# Patient Record
Sex: MALE | URBAN - METROPOLITAN AREA
[De-identification: names, ages, dates, MRNs, and addresses within clinical notes are randomized per-mention and may not be internally consistent; named-entity substitution may affect disease eponyms.]

---

## 2023-11-21 ENCOUNTER — APPOINTMENT (RX ONLY)
Dept: URBAN - METROPOLITAN AREA CLINIC 335 | Facility: CLINIC | Age: 23
Setting detail: DERMATOLOGY
End: 2023-11-21

## 2023-11-21 DIAGNOSIS — L70.0 ACNE VULGARIS: ICD-10-CM | Status: INADEQUATELY CONTROLLED

## 2023-11-21 PROCEDURE — ? COUNSELING

## 2023-11-21 PROCEDURE — ? NO CHARGE VISIT

## 2023-11-21 PROCEDURE — ? PRESCRIPTION

## 2023-11-21 RX ORDER — CLINDAMYCIN PHOSPHATE 10 MG/ML
SOLUTION TOPICAL
Qty: 60 | Refills: 2 | Status: ERX | COMMUNITY
Start: 2023-11-21

## 2023-11-21 RX ADMIN — CLINDAMYCIN PHOSPHATE: 10 SOLUTION TOPICAL at 00:00

## 2023-11-21 ASSESSMENT — LOCATION ZONE DERM: LOCATION ZONE: FACE

## 2023-11-21 ASSESSMENT — LOCATION SIMPLE DESCRIPTION DERM: LOCATION SIMPLE: LEFT CHEEK

## 2023-11-21 ASSESSMENT — LOCATION DETAILED DESCRIPTION DERM: LOCATION DETAILED: LEFT CENTRAL MALAR CHEEK

## 2023-11-21 NOTE — PROCEDURE: COUNSELING
Sarecycline Counseling: Patient advised regarding possible photosensitivity and discoloration of the teeth, skin, lips, tongue and gums.  Patient instructed to avoid sunlight, if possible.  When exposed to sunlight, patients should wear protective clothing, sunglasses, and sunscreen.  The patient was instructed to call the office immediately if the following severe adverse effects occur:  hearing changes, easy bruising/bleeding, severe headache, or vision changes.  The patient verbalized understanding of the proper use and possible adverse effects of sarecycline.  All of the patient's questions and concerns were addressed.
Benzoyl Peroxide Pregnancy And Lactation Text: This medication is Pregnancy Category C. It is unknown if benzoyl peroxide is excreted in breast milk.
Doxycycline Pregnancy And Lactation Text: This medication is Pregnancy Category D and not consider safe during pregnancy. It is also excreted in breast milk but is considered safe for shorter treatment courses.
Azithromycin Counseling:  I discussed with the patient the risks of azithromycin including but not limited to GI upset, allergic reaction, drug rash, diarrhea, and yeast infections.
Azelaic Acid Pregnancy And Lactation Text: This medication is considered safe during pregnancy and breast feeding.
Winlevi Counseling:  I discussed with the patient the risks of topical clascoterone including but not limited to erythema, scaling, itching, and stinging. Patient voiced their understanding.
Topical Retinoid Pregnancy And Lactation Text: This medication is Pregnancy Category C. It is unknown if this medication is excreted in breast milk.
Spironolactone Counseling: Patient advised regarding risks of diarrhea, abdominal pain, hyperkalemia, birth defects (for female patients), liver toxicity and renal toxicity. The patient may need blood work to monitor liver and kidney function and potassium levels while on therapy. The patient verbalized understanding of the proper use and possible adverse effects of spironolactone.  All of the patient's questions and concerns were addressed.
Isotretinoin Pregnancy And Lactation Text: This medication is Pregnancy Category X and is considered extremely dangerous during pregnancy. It is unknown if it is excreted in breast milk.
Erythromycin Pregnancy And Lactation Text: This medication is Pregnancy Category B and is considered safe during pregnancy. It is also excreted in breast milk.
Bactrim Counseling:  I discussed with the patient the risks of sulfa antibiotics including but not limited to GI upset, allergic reaction, drug rash, diarrhea, dizziness, photosensitivity, and yeast infections.  Rarely, more serious reactions can occur including but not limited to aplastic anemia, agranulocytosis, methemoglobinemia, blood dyscrasias, liver or kidney failure, lung infiltrates or desquamative/blistering drug rashes.
Topical Clindamycin Counseling: Patient counseled that this medication may cause skin irritation or allergic reactions.  In the event of skin irritation, the patient was advised to reduce the amount of the drug applied or use it less frequently.   The patient verbalized understanding of the proper use and possible adverse effects of clindamycin.  All of the patient's questions and concerns were addressed.
High Dose Vitamin A Counseling: Side effects reviewed, pt to contact office should one occur.
Tetracycline Pregnancy And Lactation Text: This medication is Pregnancy Category D and not consider safe during pregnancy. It is also excreted in breast milk.
Birth Control Pills Pregnancy And Lactation Text: This medication should be avoided if pregnant and for the first 30 days post-partum.
Tazorac Counseling:  Patient advised that medication is irritating and drying.  Patient may need to apply sparingly and wash off after an hour before eventually leaving it on overnight.  The patient verbalized understanding of the proper use and possible adverse effects of tazorac.  All of the patient's questions and concerns were addressed.
Include Pregnancy/Lactation Warning?: No
Aklief Pregnancy And Lactation Text: It is unknown if this medication is safe to use during pregnancy.  It is unknown if this medication is excreted in breast milk.  Breastfeeding women should use the topical cream on the smallest area of the skin for the shortest time needed while breastfeeding.  Do not apply to nipple and areola.
Topical Sulfur Applications Counseling: Topical Sulfur Counseling: Patient counseled that this medication may cause skin irritation or allergic reactions.  In the event of skin irritation, the patient was advised to reduce the amount of the drug applied or use it less frequently.   The patient verbalized understanding of the proper use and possible adverse effects of topical sulfur application.  All of the patient's questions and concerns were addressed.
Minocycline Counseling: Patient advised regarding possible photosensitivity and discoloration of the teeth, skin, lips, tongue and gums.  Patient instructed to avoid sunlight, if possible.  When exposed to sunlight, patients should wear protective clothing, sunglasses, and sunscreen.  The patient was instructed to call the office immediately if the following severe adverse effects occur:  hearing changes, easy bruising/bleeding, severe headache, or vision changes.  The patient verbalized understanding of the proper use and possible adverse effects of minocycline.  All of the patient's questions and concerns were addressed.
Dapsone Pregnancy And Lactation Text: This medication is Pregnancy Category C and is not considered safe during pregnancy or breast feeding.
Erythromycin Counseling:  I discussed with the patient the risks of erythromycin including but not limited to GI upset, allergic reaction, drug rash, diarrhea, increase in liver enzymes, and yeast infections.
Azithromycin Pregnancy And Lactation Text: This medication is considered safe during pregnancy and is also secreted in breast milk.
Doxycycline Counseling:  Patient counseled regarding possible photosensitivity and increased risk for sunburn.  Patient instructed to avoid sunlight, if possible.  When exposed to sunlight, patients should wear protective clothing, sunglasses, and sunscreen.  The patient was instructed to call the office immediately if the following severe adverse effects occur:  hearing changes, easy bruising/bleeding, severe headache, or vision changes.  The patient verbalized understanding of the proper use and possible adverse effects of doxycycline.  All of the patient's questions and concerns were addressed.
Topical Retinoid counseling:  Patient advised to apply a pea-sized amount only at bedtime and wait 30 minutes after washing their face before applying.  If too drying, patient may add a non-comedogenic moisturizer. The patient verbalized understanding of the proper use and possible adverse effects of retinoids.  All of the patient's questions and concerns were addressed.
Isotretinoin Counseling: Patient should get monthly blood tests, not donate blood, not drive at night if vision affected, not share medication, and not undergo elective surgery for 6 months after tx completed. Side effects reviewed, pt to contact office should one occur.
Spironolactone Pregnancy And Lactation Text: This medication can cause feminization of the male fetus and should be avoided during pregnancy. The active metabolite is also found in breast milk.
Benzoyl Peroxide Counseling: Patient counseled that medicine may cause skin irritation and bleach clothing.  In the event of skin irritation, the patient was advised to reduce the amount of the drug applied or use it less frequently.   The patient verbalized understanding of the proper use and possible adverse effects of benzoyl peroxide.  All of the patient's questions and concerns were addressed.
Bactrim Pregnancy And Lactation Text: This medication is Pregnancy Category D and is known to cause fetal risk.  It is also excreted in breast milk.
Topical Clindamycin Pregnancy And Lactation Text: This medication is Pregnancy Category B and is considered safe during pregnancy. It is unknown if it is excreted in breast milk.
Dapsone Counseling: I discussed with the patient the risks of dapsone including but not limited to hemolytic anemia, agranulocytosis, rashes, methemoglobinemia, kidney failure, peripheral neuropathy, headaches, GI upset, and liver toxicity.  Patients who start dapsone require monitoring including baseline LFTs and weekly CBCs for the first month, then every month thereafter.  The patient verbalized understanding of the proper use and possible adverse effects of dapsone.  All of the patient's questions and concerns were addressed.
Birth Control Pills Counseling: Birth Control Pill Counseling: I discussed with the patient the potential side effects of OCPs including but not limited to increased risk of stroke, heart attack, thrombophlebitis, deep venous thrombosis, hepatic adenomas, breast changes, GI upset, headaches, and depression.  The patient verbalized understanding of the proper use and possible adverse effects of OCPs. All of the patient's questions and concerns were addressed.
Tetracycline Counseling: Patient counseled regarding possible photosensitivity and increased risk for sunburn.  Patient instructed to avoid sunlight, if possible.  When exposed to sunlight, patients should wear protective clothing, sunglasses, and sunscreen.  The patient was instructed to call the office immediately if the following severe adverse effects occur:  hearing changes, easy bruising/bleeding, severe headache, or vision changes.  The patient verbalized understanding of the proper use and possible adverse effects of tetracycline.  All of the patient's questions and concerns were addressed. Patient understands to avoid pregnancy while on therapy due to potential birth defects.
Tazorac Pregnancy And Lactation Text: This medication is not safe during pregnancy. It is unknown if this medication is excreted in breast milk.
Winlevi Pregnancy And Lactation Text: This medication is considered safe during pregnancy and breastfeeding.
Azelaic Acid Counseling: Patient counseled that medicine may cause skin irritation and to avoid applying near the eyes.  In the event of skin irritation, the patient was advised to reduce the amount of the drug applied or use it less frequently.   The patient verbalized understanding of the proper use and possible adverse effects of azelaic acid.  All of the patient's questions and concerns were addressed.
Topical Sulfur Applications Pregnancy And Lactation Text: This medication is Pregnancy Category C and has an unknown safety profile during pregnancy. It is unknown if this topical medication is excreted in breast milk.
High Dose Vitamin A Pregnancy And Lactation Text: High dose vitamin A therapy is contraindicated during pregnancy and breast feeding.
Aklief counseling:  Patient advised to apply a pea-sized amount only at bedtime and wait 30 minutes after washing their face before applying.  If too drying, patient may add a non-comedogenic moisturizer.  The most commonly reported side effects including irritation, redness, scaling, dryness, stinging, burning, itching, and increased risk of sunburn.  The patient verbalized understanding of the proper use and possible adverse effects of retinoids.  All of the patient's questions and concerns were addressed.
Detail Level: Zone

## 2023-12-04 ENCOUNTER — RX ONLY (OUTPATIENT)
Age: 23
Setting detail: RX ONLY
End: 2023-12-04

## 2023-12-04 RX ORDER — DOXYCYCLINE HYCLATE 100 MG/1
CAPSULE, GELATIN COATED ORAL
Qty: 60 | Refills: 0 | Status: ERX

## 2023-12-04 RX ORDER — DOXYCYCLINE HYCLATE 100 MG/1
CAPSULE, GELATIN COATED ORAL
Qty: 60 | Refills: 0 | Status: ERX | COMMUNITY
Start: 2023-12-04

## 2024-01-03 ENCOUNTER — RX ONLY (OUTPATIENT)
Age: 24
Setting detail: RX ONLY
End: 2024-01-03

## 2024-01-03 RX ORDER — KETOCONAZOLE 20 MG/ML
SHAMPOO, SUSPENSION TOPICAL
Qty: 120 | Refills: 1 | Status: ERX | COMMUNITY
Start: 2024-01-03

## 2024-03-15 ENCOUNTER — APPOINTMENT (RX ONLY)
Dept: URBAN - METROPOLITAN AREA CLINIC 335 | Facility: CLINIC | Age: 24
Setting detail: DERMATOLOGY
End: 2024-03-15

## 2024-03-15 DIAGNOSIS — Z41.9 ENCOUNTER FOR PROCEDURE FOR PURPOSES OTHER THAN REMEDYING HEALTH STATE, UNSPECIFIED: ICD-10-CM

## 2024-03-15 PROCEDURE — ? EXPRESS HYDRAFACIAL

## 2024-03-15 PROCEDURE — ? PRODUCT LINE (REVISION)

## 2024-03-15 ASSESSMENT — LOCATION DETAILED DESCRIPTION DERM: LOCATION DETAILED: LEFT CENTRAL MALAR CHEEK

## 2024-03-15 ASSESSMENT — LOCATION ZONE DERM: LOCATION ZONE: FACE

## 2024-03-15 ASSESSMENT — LOCATION SIMPLE DESCRIPTION DERM: LOCATION SIMPLE: LEFT CHEEK

## 2024-03-15 NOTE — PROCEDURE: PRODUCT LINE (REVISION)
Product 1 Units: 0
Product 5 Price (In Dollars - Numeric Only, No Special Characters Or $): 114.00
Product 20 Price (In Dollars - Numeric Only, No Special Characters Or $): 0.00
Detail Level: Zone
Product 14 Price (In Dollars - Numeric Only, No Special Characters Or $): 151.00
Product 8 Price (In Dollars - Numeric Only, No Special Characters Or $): 94.00
Product 17 Price (In Dollars - Numeric Only, No Special Characters Or $): 44
Product 1 Application Directions: Using a circular motion, massage into skin until fully absorbed. Apply twice daily. To see optimal results, it is recommended to exfoliate twice a week using a loofah or physical exfoliator.
Product 11 Price (In Dollars - Numeric Only, No Special Characters Or $): 52.00
Product 8 Units: 1
Product 5 Application Directions: Gently dispense up to one pump and dot around eye area. Massage product onto skin using cooling metal applicator. Tap in excess product with finger. Avoid direct eye contact. Use twice daily
Product 17 Application Directions: Use one pump up to twice daily, work into skin and rinse thoroughly
Name Of Product 2: Brightening face wash
Assigning Risk Information: Per AMA, level of risk is based upon consequences of the problem(s) addressed at the encounter when appropriately treated. Risk also includes medical decision making related to the need to initiate or forego further testing, treatment and/or hospitalization. Over the counter medication are assigned a risk level of low. Prescription medication management is assigned a risk level of moderate.
Product 14 Application Directions: Apply to clean skin prior to moisturizer. Dispense desired amount and massage serum onto any fine lines and wrinkles. Avoid direct eye contact. Use twice daily. NOTE: A thin layer of product is all that is needed
Product 8 Application Directions: Apply to clean skin. Dispense two pumps into palm of hand and gently apply to face. Avoid eye area. Use twice daily. Can be used alone or as a moisture booster under creams or lotions.
Product 11 Application Directions: Using fingertips, apply a generous amount onto dry skin, avoiding eye area. Lightly scrub in the mask using circular motions and moving outward to stimulate gentle exfoliation. Leave on for 15-20 minutes. Remove with warm water and if needed, a washcloth. Pat skin dry. Use 1-3 times per week.
Product 2 Price (In Dollars - Numeric Only, No Special Characters Or $): 40.00
Name Of Product 3: C+ Brightening Eye Complex
Name Of Product 6: DEJ Boosting Serum
Risk Of Complication Category: No MDM
Name Of Product 12: Retinol Complete 0.5
Name Of Product 9: Intellishade TruPhysical
Name Of Product 15: Soothing Facial Rinse
Product 3 Price (In Dollars - Numeric Only, No Special Characters Or $): 118
Product 6 Price (In Dollars - Numeric Only, No Special Characters Or $): 225.00
Product 9 Price (In Dollars - Numeric Only, No Special Characters Or $): 80.00
Allow Plan To Count Towards E/M Coding: Yes
Product 2 Application Directions: Wet face with tepid water. Dispense a dime-sized amount into palm of hand. Lather in hands. Using fingertips, gently massage onto face using circular motions. Avoid eye area. Rinse thoroughly and pat skin dry. Can be used once or twice daily as tolerated.
Product 12 Price (In Dollars - Numeric Only, No Special Characters Or $): 104.00
Product 3 Application Directions: Dispense a small amount onto cooling metal applicator and massage outward in a circular motion onto under-eye area only. Tap in excess product with finger. Avoid direct eye contact. Use twice daily.
Product 6 Application Directions: Used twice daily on the full face, including the total eye area.
Product 9 Application Directions: Apply evenly to face as the last step in your morning skincare routine. Wear alone or under makeup. Apply liberally 15 minutes prior to sun exposure (See Drug Fact(s) Panels on cartons.). Can be used with Vitamin C Lotion 15% or 30% for added antioxidant benefits
Product 15 Application Directions: After cleansing, saturate a cotton pad with toner. Gently swipe across face. Avoid eye area. Use twice daily
Product 12 Application Directions: Use only at night. After cleansing, dispense one to two pumps on back of hand. Apply to face two to three times per week, avoiding the eye area. Increase usage as tolerated.
Name Of Product 4: C+ Correcting Complex 30%
Name Of Product 10: Nectifirm
Name Of Product 7: Gentle Cleansing Lotion
Name Of Product 13: Retinol Complete 1.0
Name Of Product 16: YouthFull Lip Replenisher
Product 4 Price (In Dollars - Numeric Only, No Special Characters Or $): 170
Product 16 Price (In Dollars - Numeric Only, No Special Characters Or $): 38.00
Product 13 Price (In Dollars - Numeric Only, No Special Characters Or $): 127.00
Product 10 Price (In Dollars - Numeric Only, No Special Characters Or $): 98.00
Product 7 Price (In Dollars - Numeric Only, No Special Characters Or $): 40
Name Of Product 1: Andrewirm
Product 4 Application Directions: Use morning and evening after cleansing, but before moisturizing. Dispense one pump into palm of hand and apply evenly to the face, avoiding the eye area. Be sure to follow with SPF
Product 16 Application Directions: Glide onto lips in a massaging motion. Use three times daily for optimal results or as needed. Use alone or layered over your favorite lip color.
Product 13 Application Directions: Use only at night. After cleansing, dispense one to two pumps on back of hand. Apply to face two to three times per week, avoiding the eye area. Increase usage as tolerated
Product 7 Application Directions: Wet face with tepid water. Dispense a quarter-sized amount into palm of hand. Using fingertips, gently massage onto face using circular motions. Rinse thoroughly and pat skin dry. Use twice daily, morning and evening
Product 10 Application Directions: Dispense one or more pumps and gently apply onto décolletage. Using upward strokes, work your way up to the neck and jawline. Use twice daily.
Name Of Product 5: DEJ Eye Cream
Product 1 Price (In Dollars - Numeric Only, No Special Characters Or $): 157.50
Name Of Product 14: Revox Line Relaxer
Name Of Product 17: Gentle foaming cleanser
Name Of Product 11: Pumpkin Enzyme Mask
Name Of Product 8: Hydrating Serum

## 2024-03-15 NOTE — PROCEDURE: EXPRESS HYDRAFACIAL
Solution: Activ-4
Number Of Passes: 1
Vacuum Pressure Low Setting (Will Not Render If Set To 0): 0
Procedure: Peel
Tip: Hydropeel Tip, Teal
Number Of Passes: 2
Procedure: Fusion
Tip Override
Price (Use Numbers Only, No Special Characters Or $): 199
Solution: GlySal 7.5%
Procedure: Extraction
Vacuum Pressure High Setting (Will Not Render If Set To 0): 15
Tip: Hydropeel Tip, Clear
Solution: Antiox-6
Indication: acne
Consent: Written consent obtained, risks reviewed including but not limited to crusting, scabbing, blistering, scarring, darker or lighter pigmentary change, bruising, and/or incomplete response.
Tip: Hydropeel Tip, Blue
Solution: Beta-HD
Procedure: Exfoliation
Post-Care Instructions: I reviewed with the patient in detail post-care instructions. Patient should stay away from the sun and wear sun protection until treated areas are fully healed.
Solution Override
Location: face

## 2024-05-10 ENCOUNTER — APPOINTMENT (RX ONLY)
Dept: URBAN - METROPOLITAN AREA CLINIC 335 | Facility: CLINIC | Age: 24
Setting detail: DERMATOLOGY
End: 2024-05-10

## 2024-05-10 DIAGNOSIS — Z41.9 ENCOUNTER FOR PROCEDURE FOR PURPOSES OTHER THAN REMEDYING HEALTH STATE, UNSPECIFIED: ICD-10-CM

## 2024-05-10 PROCEDURE — ? CHEMICAL PEEL (CUSTOM)

## 2024-05-10 ASSESSMENT — LOCATION ZONE DERM: LOCATION ZONE: FACE

## 2024-05-10 ASSESSMENT — LOCATION SIMPLE DESCRIPTION DERM: LOCATION SIMPLE: LEFT CHEEK

## 2024-05-10 ASSESSMENT — LOCATION DETAILED DESCRIPTION DERM: LOCATION DETAILED: LEFT CENTRAL MALAR CHEEK

## 2024-05-30 RX ORDER — CLINDAMYCIN PHOSPHATE 10 MG/ML
SOLUTION TOPICAL
Qty: 60 | Refills: 2 | Status: ERX

## 2024-06-25 ENCOUNTER — RX ONLY (OUTPATIENT)
Age: 24
Setting detail: RX ONLY
End: 2024-06-25

## 2024-06-25 RX ORDER — CLINDAMYCIN PHOSPHATE 10 MG/ML
SOLUTION TOPICAL
Qty: 60 | Refills: 2 | Status: ERX

## 2024-06-25 RX ORDER — DOXYCYCLINE HYCLATE 100 MG/1
CAPSULE, GELATIN COATED ORAL
Qty: 60 | Refills: 2 | Status: ERX

## 2024-07-10 ENCOUNTER — RX ONLY (OUTPATIENT)
Age: 24
Setting detail: RX ONLY
End: 2024-07-10

## 2024-07-10 RX ORDER — TRIAMCINOLONE ACETONIDE 1 MG/G
CREAM TOPICAL
Qty: 80 | Refills: 0 | Status: ERX | COMMUNITY
Start: 2024-07-10

## 2024-07-26 ENCOUNTER — APPOINTMENT (RX ONLY)
Dept: URBAN - METROPOLITAN AREA CLINIC 335 | Facility: CLINIC | Age: 24
Setting detail: DERMATOLOGY
End: 2024-07-26

## 2024-07-26 DIAGNOSIS — Z41.9 ENCOUNTER FOR PROCEDURE FOR PURPOSES OTHER THAN REMEDYING HEALTH STATE, UNSPECIFIED: ICD-10-CM

## 2024-07-26 PROCEDURE — ? CHEMICAL PEEL (CUSTOM)

## 2024-07-26 PROCEDURE — ? EXPRESS HYDRAFACIAL

## 2024-07-26 ASSESSMENT — LOCATION DETAILED DESCRIPTION DERM: LOCATION DETAILED: LEFT MEDIAL MALAR CHEEK

## 2024-07-26 ASSESSMENT — LOCATION SIMPLE DESCRIPTION DERM: LOCATION SIMPLE: LEFT CHEEK

## 2024-07-26 ASSESSMENT — LOCATION ZONE DERM: LOCATION ZONE: FACE

## 2024-07-26 NOTE — PROCEDURE: CHEMICAL PEEL (CUSTOM)
Detail Level: Zone
Consent: Prior to the procedure, written consent was obtained and risks, benefits, expectations and alternatives were reviewed, including, but not limited to,  redness, peeling, blistering, pigmentary change, scarring, infection, and pain.
Number Of Coats: 1
Prep: The treated area was degreased with pre-peel cleanser, and vaseline was applied for protection of mucous membranes.
Frostin

## 2024-07-26 NOTE — PROCEDURE: EXPRESS HYDRAFACIAL
Vacuum Pressure Low Setting (Will Not Render If Set To 0): 0
Location: face
Number Of Passes: 1
Procedure: Fusion
Solution: Beta-HD
Tip: Hydropeel Tip, Teal
Procedure: Exfoliation
Consent: Written consent obtained, risks reviewed including but not limited to crusting, scabbing, blistering, scarring, darker or lighter pigmentary change, bruising, and/or incomplete response.
Solution Override
Price (Use Numbers Only, No Special Characters Or $): 199
Number Of Passes: 2
Procedure: Peel
Post-Care Instructions: I reviewed with the patient in detail post-care instructions. Patient should stay away from the sun and wear sun protection until treated areas are fully healed.
Procedure: Extraction
Tip: Hydropeel Tip, Blue
Solution: Activ-4
Solution Override: BioRePeel
Tip: Hydropeel Tip, Clear
Indication: acne
Solution: Antiox-6
Vacuum Pressure High Setting (Will Not Render If Set To 0): 15
Tip Override

## 2024-08-29 ENCOUNTER — RX ONLY (OUTPATIENT)
Age: 24
Setting detail: RX ONLY
End: 2024-08-29

## 2024-08-29 RX ORDER — DOXYCYCLINE HYCLATE 100 MG/1
CAPSULE, GELATIN COATED ORAL
Qty: 60 | Refills: 4 | Status: ERX

## 2024-09-16 ENCOUNTER — RX ONLY (OUTPATIENT)
Age: 24
Setting detail: RX ONLY
End: 2024-09-16

## 2024-09-16 RX ORDER — TRETIONIN 1 MG/G
CREAM TOPICAL
Qty: 45 | Refills: 3 | Status: ERX | COMMUNITY
Start: 2024-09-16

## 2024-09-16 RX ORDER — CLINDAMYCIN PHOSPHATE 10 MG/ML
SOLUTION TOPICAL
Qty: 60 | Refills: 2 | Status: ERX

## 2024-09-23 ENCOUNTER — RX ONLY (OUTPATIENT)
Age: 24
Setting detail: RX ONLY
End: 2024-09-23

## 2024-09-23 RX ORDER — TRETINOIN/HYALURONATE/NIACIN 0.1-0.5-4%
CREAM (GRAM) TOPICAL
Qty: 30 | Refills: 0 | Status: ERX | COMMUNITY
Start: 2024-09-23

## 2024-09-27 ENCOUNTER — APPOINTMENT (RX ONLY)
Dept: URBAN - METROPOLITAN AREA CLINIC 335 | Facility: CLINIC | Age: 24
Setting detail: DERMATOLOGY
End: 2024-09-27

## 2024-09-27 DIAGNOSIS — Z41.9 ENCOUNTER FOR PROCEDURE FOR PURPOSES OTHER THAN REMEDYING HEALTH STATE, UNSPECIFIED: ICD-10-CM

## 2024-09-27 PROCEDURE — ? EXPRESS HYDRAFACIAL

## 2024-09-27 NOTE — PROCEDURE: EXPRESS HYDRAFACIAL
Solution: Activ-4
Vacuum Pressure High Setting (Will Not Render If Set To 0): 0
Procedure: Peel
Number Of Passes: 1
Consent: Written consent obtained, risks reviewed including but not limited to crusting, scabbing, blistering, scarring, darker or lighter pigmentary change, bruising, and/or incomplete response.
Solution: Beta-HD
Tip: Hydropeel Tip, Clear
Price (Use Numbers Only, No Special Characters Or $): 199
Post-Care Instructions: I reviewed with the patient in detail post-care instructions. Patient should stay away from the sun and wear sun protection until treated areas are fully healed.
Solution Override
Vacuum Pressure High Setting (Will Not Render If Set To 0): 15
Solution: GlySal 15%
Number Of Passes: 3
Procedure: Fusion
Tip: Hydropeel Tip, Blue
Indication: acne
Procedure: Exfoliation
Number Of Passes: 2
Tip: Hydropeel Tip, Teal
Solution: Antiox-6
Location: face
Tip Override
Procedure: Extraction

## 2024-12-19 ENCOUNTER — APPOINTMENT (OUTPATIENT)
Dept: URBAN - METROPOLITAN AREA CLINIC 335 | Facility: CLINIC | Age: 24
Setting detail: DERMATOLOGY
End: 2024-12-19

## 2024-12-19 DIAGNOSIS — Z41.9 ENCOUNTER FOR PROCEDURE FOR PURPOSES OTHER THAN REMEDYING HEALTH STATE, UNSPECIFIED: ICD-10-CM

## 2024-12-19 PROCEDURE — ? EXPRESS HYDRAFACIAL

## 2024-12-19 NOTE — PROCEDURE: EXPRESS HYDRAFACIAL
Vacuum Pressure High Setting (Will Not Render If Set To 0): 0
Tip: Hydropeel Tip, Clear
Indication: acne
Consent: Written consent obtained, risks reviewed including but not limited to crusting, scabbing, blistering, scarring, darker or lighter pigmentary change, bruising, and/or incomplete response.
Number Of Passes: 2
Vacuum Pressure High Setting (Will Not Render If Set To 0): 15
Solution: Antiox-6
Procedure: Fusion
Number Of Passes: 1
Tip: Hydropeel Tip, Blue
Post-Care Instructions: I reviewed with the patient in detail post-care instructions. Patient should stay away from the sun and wear sun protection until treated areas are fully healed.
Procedure: Exfoliation
Tip Override
Solution: GlySal 30%
Location: face
Tip: Hydropeel Tip, Teal
Procedure: Extraction
Solution: Activ-4
Solution Override
Price (Use Numbers Only, No Special Characters Or $): 199
Procedure: Peel
Solution: Beta-HD

## 2025-02-06 ENCOUNTER — RX ONLY (RX ONLY)
Age: 25
End: 2025-02-06

## 2025-02-06 RX ORDER — CLINDAMYCIN PHOSPHATE 10 MG/ML
SOLUTION TOPICAL
Qty: 60 | Refills: 2 | Status: CANCELLED

## 2025-02-18 ENCOUNTER — RX ONLY (RX ONLY)
Age: 25
End: 2025-02-18

## 2025-02-18 RX ORDER — CLINDAMYCIN PHOSPHATE 10 MG/ML
SOLUTION TOPICAL
Qty: 60 | Refills: 2 | Status: CANCELLED

## 2025-02-24 ENCOUNTER — RX ONLY (RX ONLY)
Age: 25
End: 2025-02-24

## 2025-02-24 RX ORDER — CLINDAMYCIN PHOSPHATE 10 MG/ML
SOLUTION TOPICAL
Qty: 60 | Refills: 2 | Status: ERX

## 2025-02-28 ENCOUNTER — APPOINTMENT (OUTPATIENT)
Dept: URBAN - METROPOLITAN AREA CLINIC 335 | Facility: CLINIC | Age: 25
Setting detail: DERMATOLOGY
End: 2025-02-28

## 2025-02-28 DIAGNOSIS — Z41.9 ENCOUNTER FOR PROCEDURE FOR PURPOSES OTHER THAN REMEDYING HEALTH STATE, UNSPECIFIED: ICD-10-CM

## 2025-02-28 PROCEDURE — ? EXPRESS HYDRAFACIAL

## 2025-02-28 PROCEDURE — ? COSMETIC EXTRACTIONS

## 2025-02-28 ASSESSMENT — LOCATION DETAILED DESCRIPTION DERM
LOCATION DETAILED: RIGHT INFERIOR MEDIAL FOREHEAD
LOCATION DETAILED: LEFT CENTRAL MALAR CHEEK

## 2025-02-28 ASSESSMENT — LOCATION ZONE DERM: LOCATION ZONE: FACE

## 2025-02-28 ASSESSMENT — LOCATION SIMPLE DESCRIPTION DERM
LOCATION SIMPLE: LEFT CHEEK
LOCATION SIMPLE: RIGHT FOREHEAD

## 2025-02-28 NOTE — PROCEDURE: EXPRESS HYDRAFACIAL
Tip: Hydropeel Tip, Teal
Solution: GlySal 7.5%
Tip Override
Solution: Antiox-6
Location: face
Solution: Activ-4
Vacuum Pressure Low Setting (Will Not Render If Set To 0): 0
Procedure: Fusion
Number Of Passes: 2
Procedure: Extraction
Procedure: Peel
Solution: Beta-HD
Treatment Number: 1
Solution Override
Price (Use Numbers Only, No Special Characters Or $): 199
Tip: Hydropeel Tip, Clear
Vacuum Pressure High Setting (Will Not Render If Set To 0): 15
Indication: dehydrated skin
Tip: Hydropeel Tip, Blue
Consent: Written consent obtained, risks reviewed including but not limited to crusting, scabbing, blistering, scarring, darker or lighter pigmentary change, bruising, and/or incomplete response.
Post-Care Instructions: I reviewed with the patient in detail post-care instructions. Patient should stay away from the sun and wear sun protection until treated areas are fully healed.
Procedure: Exfoliation

## 2025-02-28 NOTE — PROCEDURE: COSMETIC EXTRACTIONS
Price (Use Numbers Only, No Special Characters Or $): 75
Detail Level: Zone
Anesthesia Volume In Cc: 0
Consent: The patient's consent was obtained including but not limited to risks of crusting, scabbing, blistering, scarring, darker or lighter pigmentary change, recurrence, incomplete removal and infection.
Render The Number Of Extractions: No
Post-Care Instructions: I reviewed with the patient in detail post-care instructions. Patient is to wear sunprotection, and avoid picking at any of the treated lesions. Pt may apply Vaseline to crusted or scabbing areas.

## 2025-04-01 ENCOUNTER — RX ONLY (RX ONLY)
Age: 25
End: 2025-04-01

## 2025-04-01 RX ORDER — TRIAMCINOLONE ACETONIDE 1 MG/G
CREAM TOPICAL
Qty: 454 | Refills: 0 | Status: CANCELLED

## 2025-04-02 ENCOUNTER — RX ONLY (RX ONLY)
Age: 25
End: 2025-04-02

## 2025-04-02 RX ORDER — TRIAMCINOLONE ACETONIDE 1 MG/G
CREAM TOPICAL
Qty: 80 | Refills: 3 | Status: ERX

## 2025-04-11 ENCOUNTER — APPOINTMENT (OUTPATIENT)
Dept: URBAN - METROPOLITAN AREA CLINIC 335 | Facility: CLINIC | Age: 25
Setting detail: DERMATOLOGY
End: 2025-04-11

## 2025-04-11 ENCOUNTER — RX ONLY (RX ONLY)
Age: 25
End: 2025-04-11

## 2025-04-11 DIAGNOSIS — Z41.9 ENCOUNTER FOR PROCEDURE FOR PURPOSES OTHER THAN REMEDYING HEALTH STATE, UNSPECIFIED: ICD-10-CM

## 2025-04-11 PROCEDURE — ? EXPRESS HYDRAFACIAL

## 2025-04-11 RX ORDER — AZELAIC ACID 0.15 G/G
GEL TOPICAL
Qty: 50 | Refills: 2 | Status: ERX | COMMUNITY
Start: 2025-04-11

## 2025-04-11 NOTE — PROCEDURE: EXPRESS HYDRAFACIAL
Vacuum Pressure High Setting (Will Not Render If Set To 0): 0
Solution: Activ-4
Number Of Passes: 2
Tip Override
Tip: Hydropeel Tip, Clear
Number Of Passes: 1
Tip: Hydropeel Tip, Teal
Solution: Antiox-6
Solution Override
Procedure: Fusion
Procedure: Peel
Indication: acne
Procedure: Extraction
Consent: Written consent obtained, risks reviewed including but not limited to crusting, scabbing, blistering, scarring, darker or lighter pigmentary change, bruising, and/or incomplete response.
Solution: Beta-HD
Tip: Hydropeel Tip, Blue
Vacuum Pressure High Setting (Will Not Render If Set To 0): 15
Location: face
Post-Care Instructions: I reviewed with the patient in detail post-care instructions. Patient should stay away from the sun and wear sun protection until treated areas are fully healed.
Price (Use Numbers Only, No Special Characters Or $): 199
Procedure: Exfoliation
Solution: GlySal 15%

## 2025-04-28 ENCOUNTER — RX ONLY (RX ONLY)
Age: 25
End: 2025-04-28

## 2025-04-28 RX ORDER — ASPIRIN 325 MG
TABLET, DELAYED RELEASE (ENTERIC COATED) ORAL
Qty: 4 | Refills: 3 | Status: CANCELLED | COMMUNITY
Start: 2025-04-28

## 2025-04-28 RX ORDER — ASPIRIN 325 MG
TABLET, DELAYED RELEASE (ENTERIC COATED) ORAL
Qty: 4 | Refills: 1 | Status: ERX

## 2025-05-06 ENCOUNTER — RX ONLY (RX ONLY)
Age: 25
End: 2025-05-06

## 2025-05-06 RX ORDER — HYDROCORTISONE 25 MG/G
CREAM TOPICAL
Qty: 30 | Refills: 1 | Status: ACTIVE

## 2025-06-20 ENCOUNTER — APPOINTMENT (OUTPATIENT)
Dept: URBAN - METROPOLITAN AREA CLINIC 335 | Facility: CLINIC | Age: 25
Setting detail: DERMATOLOGY
End: 2025-06-20

## 2025-06-20 DIAGNOSIS — Z41.9 ENCOUNTER FOR PROCEDURE FOR PURPOSES OTHER THAN REMEDYING HEALTH STATE, UNSPECIFIED: ICD-10-CM

## 2025-06-20 PROCEDURE — ? EXPRESS HYDRAFACIAL

## 2025-06-20 NOTE — PROCEDURE: EXPRESS HYDRAFACIAL
Solution Override
Indication: dehydrated skin
Vacuum Pressure High Setting (Will Not Render If Set To 0): 0
Procedure: Fusion
Solution: GlySal 15%
Solution: Activ-4
Solution: Beta-HD
Tip: Hydropeel Tip, Clear
Number Of Passes: 2
Treatment Number: 1
Solution: Antiox-6
Procedure: Exfoliation
Consent: Written consent obtained, risks reviewed including but not limited to crusting, scabbing, blistering, scarring, darker or lighter pigmentary change, bruising, and/or incomplete response.
Location: face
Tip Override
Tip: Hydropeel Tip, Teal
Price (Use Numbers Only, No Special Characters Or $): 199
Post-Care Instructions: I reviewed with the patient in detail post-care instructions. Patient should stay away from the sun and wear sun protection until treated areas are fully healed.
Procedure: Extraction
Tip: Hydropeel Tip, Blue
Vacuum Pressure High Setting (Will Not Render If Set To 0): 15
Procedure: Peel

## 2025-06-30 ENCOUNTER — APPOINTMENT (OUTPATIENT)
Dept: URBAN - METROPOLITAN AREA CLINIC 335 | Facility: CLINIC | Age: 25
Setting detail: DERMATOLOGY
End: 2025-06-30

## 2025-06-30 DIAGNOSIS — L70.0 ACNE VULGARIS: ICD-10-CM

## 2025-06-30 PROCEDURE — ? NO CHARGE VISIT

## 2025-06-30 PROCEDURE — ? COUNSELING

## 2025-06-30 PROCEDURE — ? ISOTRETINOIN INITIATION

## 2025-06-30 PROCEDURE — ? TREATMENT REGIMEN

## 2025-06-30 PROCEDURE — ? ADDITIONAL NOTES

## 2025-06-30 PROCEDURE — ? ORDER TESTS

## 2025-06-30 ASSESSMENT — SEVERITY ASSESSMENT OVERALL AMONG ALL PATIENTS
IN YOUR EXPERIENCE, AMONG ALL PATIENTS YOU HAVE SEEN WITH THIS CONDITION, HOW SEVERE IS THIS PATIENT'S CONDITION?: MULTIPLE INFLAMMATORY LESIONS BUT NONINFLAMMATORY LESIONS PREDOMINATE

## 2025-06-30 NOTE — PROCEDURE: ISOTRETINOIN INITIATION
Anticipated Starting Dosage (Optional): 30mg BID
Detail Level: Zone
Patient Reported Weight (Optional - Include Units): 146

## 2025-06-30 NOTE — PROCEDURE: COUNSELING
Tetracycline Pregnancy And Lactation Text: This medication is Pregnancy Category D and not consider safe during pregnancy. It is also excreted in breast milk.
Benzoyl Peroxide Pregnancy And Lactation Text: This medication is Pregnancy Category C. It is unknown if benzoyl peroxide is excreted in breast milk.
Isotretinoin Counseling: Patient should get monthly blood tests, not donate blood, not drive at night if vision affected, not share medication, and not undergo elective surgery for 6 months after tx completed. Side effects reviewed, pt to contact office should one occur.
Include Pregnancy/Lactation Warning?: No
Isotretinoin Pregnancy And Lactation Text: This medication is Pregnancy Category X and is considered extremely dangerous during pregnancy. It is unknown if it is excreted in breast milk.
Bactrim Pregnancy And Lactation Text: This medication is Pregnancy Category D and is known to cause fetal risk.  It is also excreted in breast milk.
Aklief Pregnancy And Lactation Text: It is unknown if this medication is safe to use during pregnancy.  It is unknown if this medication is excreted in breast milk.  Breastfeeding women should use the topical cream on the smallest area of the skin for the shortest time needed while breastfeeding.  Do not apply to nipple and areola.
Azelaic Acid Counseling: Patient counseled that medicine may cause skin irritation and to avoid applying near the eyes.  In the event of skin irritation, the patient was advised to reduce the amount of the drug applied or use it less frequently.   The patient verbalized understanding of the proper use and possible adverse effects of azelaic acid.  All of the patient's questions and concerns were addressed.
Bactrim Counseling:  I discussed with the patient the risks of sulfa antibiotics including but not limited to GI upset, allergic reaction, drug rash, diarrhea, dizziness, photosensitivity, and yeast infections.  Rarely, more serious reactions can occur including but not limited to aplastic anemia, agranulocytosis, methemoglobinemia, blood dyscrasias, liver or kidney failure, lung infiltrates or desquamative/blistering drug rashes.
Erythromycin Pregnancy And Lactation Text: This medication is Pregnancy Category B and is considered safe during pregnancy. It is also excreted in breast milk.
Topical Clindamycin Pregnancy And Lactation Text: This medication is Pregnancy Category B and is considered safe during pregnancy. It is unknown if it is excreted in breast milk.
Topical Sulfur Applications Counseling: Topical Sulfur Counseling: Patient counseled that this medication may cause skin irritation or allergic reactions.  In the event of skin irritation, the patient was advised to reduce the amount of the drug applied or use it less frequently.   The patient verbalized understanding of the proper use and possible adverse effects of topical sulfur application.  All of the patient's questions and concerns were addressed.
High Dose Vitamin A Counseling: Side effects reviewed, pt to contact office should one occur.
Azithromycin Counseling:  I discussed with the patient the risks of azithromycin including but not limited to GI upset, allergic reaction, drug rash, diarrhea, and yeast infections.
Birth Control Pills Counseling: Birth Control Pill Counseling: I discussed with the patient the potential side effects of OCPs including but not limited to increased risk of stroke, heart attack, thrombophlebitis, deep venous thrombosis, hepatic adenomas, breast changes, GI upset, headaches, and depression.  The patient verbalized understanding of the proper use and possible adverse effects of OCPs. All of the patient's questions and concerns were addressed.
Spironolactone Counseling: Patient advised regarding risks of diarrhea, abdominal pain, hyperkalemia, birth defects (for female patients), liver toxicity and renal toxicity. The patient may need blood work to monitor liver and kidney function and potassium levels while on therapy. The patient verbalized understanding of the proper use and possible adverse effects of spironolactone.  All of the patient's questions and concerns were addressed.
Benzoyl Peroxide Counseling: Patient counseled that medicine may cause skin irritation and bleach clothing.  In the event of skin irritation, the patient was advised to reduce the amount of the drug applied or use it less frequently.   The patient verbalized understanding of the proper use and possible adverse effects of benzoyl peroxide.  All of the patient's questions and concerns were addressed.
Topical Sulfur Applications Pregnancy And Lactation Text: This medication is Pregnancy Category C and has an unknown safety profile during pregnancy. It is unknown if this topical medication is excreted in breast milk.
Topical Clindamycin Counseling: Patient counseled that this medication may cause skin irritation or allergic reactions.  In the event of skin irritation, the patient was advised to reduce the amount of the drug applied or use it less frequently.   The patient verbalized understanding of the proper use and possible adverse effects of clindamycin.  All of the patient's questions and concerns were addressed.
Tazorac Counseling:  Patient advised that medication is irritating and drying.  Patient may need to apply sparingly and wash off after an hour before eventually leaving it on overnight.  The patient verbalized understanding of the proper use and possible adverse effects of tazorac.  All of the patient's questions and concerns were addressed.
Winlevi Pregnancy And Lactation Text: This medication is considered safe during pregnancy and breastfeeding.
Tetracycline Counseling: Patient counseled regarding possible photosensitivity and increased risk for sunburn.  Patient instructed to avoid sunlight, if possible.  When exposed to sunlight, patients should wear protective clothing, sunglasses, and sunscreen.  The patient was instructed to call the office immediately if the following severe adverse effects occur:  hearing changes, easy bruising/bleeding, severe headache, or vision changes.  The patient verbalized understanding of the proper use and possible adverse effects of tetracycline.  All of the patient's questions and concerns were addressed. Patient understands to avoid pregnancy while on therapy due to potential birth defects.
Detail Level: Zone
Birth Control Pills Pregnancy And Lactation Text: This medication should be avoided if pregnant and for the first 30 days post-partum.
Dapsone Counseling: I discussed with the patient the risks of dapsone including but not limited to hemolytic anemia, agranulocytosis, rashes, methemoglobinemia, kidney failure, peripheral neuropathy, headaches, GI upset, and liver toxicity.  Patients who start dapsone require monitoring including baseline LFTs and weekly CBCs for the first month, then every month thereafter.  The patient verbalized understanding of the proper use and possible adverse effects of dapsone.  All of the patient's questions and concerns were addressed.
Aklief counseling:  Patient advised to apply a pea-sized amount only at bedtime and wait 30 minutes after washing their face before applying.  If too drying, patient may add a non-comedogenic moisturizer.  The most commonly reported side effects including irritation, redness, scaling, dryness, stinging, burning, itching, and increased risk of sunburn.  The patient verbalized understanding of the proper use and possible adverse effects of retinoids.  All of the patient's questions and concerns were addressed.
Doxycycline Counseling:  Patient counseled regarding possible photosensitivity and increased risk for sunburn.  Patient instructed to avoid sunlight, if possible.  When exposed to sunlight, patients should wear protective clothing, sunglasses, and sunscreen.  The patient was instructed to call the office immediately if the following severe adverse effects occur:  hearing changes, easy bruising/bleeding, severe headache, or vision changes.  The patient verbalized understanding of the proper use and possible adverse effects of doxycycline.  All of the patient's questions and concerns were addressed.
Tazorac Pregnancy And Lactation Text: This medication is not safe during pregnancy. It is unknown if this medication is excreted in breast milk.
Topical Retinoid Pregnancy And Lactation Text: This medication is Pregnancy Category C. It is unknown if this medication is excreted in breast milk.
Winlevi Counseling:  I discussed with the patient the risks of topical clascoterone including but not limited to erythema, scaling, itching, and stinging. Patient voiced their understanding.
Azithromycin Pregnancy And Lactation Text: This medication is considered safe during pregnancy and is also secreted in breast milk.
Spironolactone Pregnancy And Lactation Text: This medication can cause feminization of the male fetus and should be avoided during pregnancy. The active metabolite is also found in breast milk.
Azelaic Acid Pregnancy And Lactation Text: This medication is considered safe during pregnancy and breast feeding.
High Dose Vitamin A Pregnancy And Lactation Text: High dose vitamin A therapy is contraindicated during pregnancy and breast feeding.
Sarecycline Counseling: Patient advised regarding possible photosensitivity and discoloration of the teeth, skin, lips, tongue and gums.  Patient instructed to avoid sunlight, if possible.  When exposed to sunlight, patients should wear protective clothing, sunglasses, and sunscreen.  The patient was instructed to call the office immediately if the following severe adverse effects occur:  hearing changes, easy bruising/bleeding, severe headache, or vision changes.  The patient verbalized understanding of the proper use and possible adverse effects of sarecycline.  All of the patient's questions and concerns were addressed.
Minocycline Counseling: Patient advised regarding possible photosensitivity and discoloration of the teeth, skin, lips, tongue and gums.  Patient instructed to avoid sunlight, if possible.  When exposed to sunlight, patients should wear protective clothing, sunglasses, and sunscreen.  The patient was instructed to call the office immediately if the following severe adverse effects occur:  hearing changes, easy bruising/bleeding, severe headache, or vision changes.  The patient verbalized understanding of the proper use and possible adverse effects of minocycline.  All of the patient's questions and concerns were addressed.
Erythromycin Counseling:  I discussed with the patient the risks of erythromycin including but not limited to GI upset, allergic reaction, drug rash, diarrhea, increase in liver enzymes, and yeast infections.
Doxycycline Pregnancy And Lactation Text: This medication is Pregnancy Category D and not consider safe during pregnancy. It is also excreted in breast milk but is considered safe for shorter treatment courses.
Topical Retinoid counseling:  Patient advised to apply a pea-sized amount only at bedtime and wait 30 minutes after washing their face before applying.  If too drying, patient may add a non-comedogenic moisturizer. The patient verbalized understanding of the proper use and possible adverse effects of retinoids.  All of the patient's questions and concerns were addressed.
Dapsone Pregnancy And Lactation Text: This medication is Pregnancy Category C and is not considered safe during pregnancy or breast feeding.

## 2025-06-30 NOTE — PROCEDURE: ORDER TESTS
Performing Laboratory: [id number]
Bill For Surgical Tray: no
Billing Type: Third-Party Bill
Expected Date Of Service: 06/30/2025

## 2025-06-30 NOTE — PROCEDURE: ADDITIONAL NOTES
Render Risk Assessment In Note?: no
Additional Notes: Treatment goal is to achieve a reduction in inflammatory papules and pustules as well as a reduction in comedones.
Detail Level: Simple
Additional Notes: Patient consent was obtained to proceed with the visit and recommended plan of care after discussion of all risks and benefits, including the risks of COVID-19 exposure
Additional Notes: Tried and failed doxycycline 100mg, tretinoin 0.1% cream, azeliac acid, Clindamycin topical solution

## 2025-07-01 ENCOUNTER — APPOINTMENT (OUTPATIENT)
Dept: URBAN - METROPOLITAN AREA CLINIC 335 | Facility: CLINIC | Age: 25
Setting detail: DERMATOLOGY
End: 2025-07-01

## 2025-07-01 DIAGNOSIS — Z41.9 ENCOUNTER FOR PROCEDURE FOR PURPOSES OTHER THAN REMEDYING HEALTH STATE, UNSPECIFIED: ICD-10-CM

## 2025-07-01 PROCEDURE — ? DELUXE HYDRAFACIAL

## 2025-07-01 PROCEDURE — ? COSMETIC QUOTE

## 2025-07-01 PROCEDURE — ? PRODUCT LINE (OBAGI)

## 2025-07-01 PROCEDURE — ? PRODUCT LINE (ELTA MD)

## 2025-07-01 PROCEDURE — ? COSMETIC CONSULTATION: CHEMICAL PEELS

## 2025-07-01 NOTE — PROCEDURE: COSMETIC CONSULTATION: CHEMICAL PEELS
Detail Level: Detailed
Procedure Quote $ (Will Render In Note. Use Numbers Only, No Text Please.): 389.00
Send Procedure Quote As Charge: No

## 2025-07-01 NOTE — PROCEDURE: DELUXE HYDRAFACIAL
Tip Override
Tip: Hydropeel Tip, Clear
Number Of Passes: 0
Number Of Passes: 2
Procedure: Exfoliation
Vacuum Pressure High Setting (Will Not Render If Set To 0): 15
Solution Override
Location: face
Tip: Hydropeel Tip, Orange Aggression
Post-Care Instructions: I reviewed with the patient in detail post-care instructions. Patient should stay away from the sun and wear sun protection until treated areas are fully healed.
Comments: Manual extraction add on
Tip: Hydropeel Tip, Teal
Solution: Activ-4
Procedure: Peel
Consent: Written consent obtained, risks reviewed including but not limited to crusting, scabbing, blistering, scarring, darker or lighter pigmentary change, bruising, and/or incomplete response.
Indication: acne
Procedure: Extraction
Treatment Number: 1
Solution: GlySal 15%
Procedure: Extend and Protect
Solution: Beta-HD
Solution Override: moisturizer and sunscreen

## 2025-07-01 NOTE — PROCEDURE: PRODUCT LINE (OBAGI)
Product 14 Price (In Dollars - Numeric Only, No Special Characters Or $): 0.00
Product 4 Application Directions: Wet face with tepid water. Dispense a dime-sized amount into palm of hand. Lather in hands. Using fingertips, gently massage onto face using circular motions. Avoid eye area. Rinse thoroughly and pat skin dry. Can be used once or twice daily as tolerated.
Product 3 Price (In Dollars - Numeric Only, No Special Characters Or $): 49.00
Product 1 Application Directions: Apply twice daily after cleansing and using all other treatment products. Smooth cream onto the face avoiding the eye area. Follow with an application of sunscreen in the morning.
Product 28 Units: 0
Product 2 Price (In Dollars - Numeric Only, No Special Characters Or $): 130.00
Name Of Product 2: Obagi Nu-Derm®\\nClear\\nDark Spot Lightener & Corrector Cream\\nFind a Physician
Product 8 Price (In Dollars - Numeric Only, No Special Characters Or $): 50.00
Product 8 Application Directions: Apply  morning and evening after cleansing and applying all other treatment products, dispense one to two pumps into palm of hand and gently smooth onto face avoiding the eye area.
Render Product Pricing In Note: Yes
Product 6 Price (In Dollars - Numeric Only, No Special Characters Or $): 65.00
Product 7 Application Directions: Step 1: Cleanse the skin and allow the skin to completely dry.\\nStep 2: Apply a thin layer of Obagi Nu-Cil® Eyelash Enhancing Serum to the base of the upper lash line. (Do not apply to the lower lid or any other areas)\\nStep 3: Wait 90 seconds for product to dry completely before applying another product to the skin.
Product 5 Units: 1
Name Of Product 8: THERAPEUTIC MOISTURIZER\\nAcne Therapeutic Hydrating Moisturizer
Name Of Product 1: Obagi Nu-Derm®\\nBlender\\nDark Spot Lightener & Blending Cream
Name Of Product 7: OBAGI NU-CIL®\\nEYELASH ENHANCING SERUM
Detail Level: Zone
Assigning Risk Information: Per AMA, level of risk is based upon consequences of the problem(s) addressed at the encounter when appropriately treated. Risk also includes medical decision making related to the need to initiate or forego further testing, treatment and/or hospitalization. Over the counter medication are assigned a risk level of low. Prescription medication management is assigned a risk level of moderate.
Product 7 Price (In Dollars - Numeric Only, No Special Characters Or $): 120.00
Name Of Product 5: MARISABEL CARLOS®\\nDAILY CARE FOAMING CLEANSER\\nLightweight Foaming Acne Cleanser
Name Of Product 3: Obagi Nu-Derm®\\nGENTLE CLEANSER\\nGentle Face Cleanser for Sensitive Skin
Risk Of Complication Category: Low (OTC Medications)
Product 6 Application Directions: In the morning and evening after cleansing and applying all other treatment products, dispense one to two pumps into palm of hand and gently smooth onto face avoiding the eye area.
Name Of Product 6: OBAGI HYDRATE®\\nHYDRATE® FACIAL MOISTURIZER\\nNon-comedogenic 8-hour Hydrating Face Cream
Name Of Product 4: Obagi Nu-Derm®\\nFOAMING GEL\\nGel Based Cleanser

## 2025-07-01 NOTE — PROCEDURE: PRODUCT LINE (ELTA MD)
Product 16 Units: 0
Name Of Product 5: EltaMD UV Stick Broad-Spectrum SPF 50+
Render Product Pricing In Note: Yes
Product 41 Price (In Dollars - Numeric Only, No Special Characters Or $): 0.00
Assigning Risk Information: Per AMA, level of risk is based upon consequences of the problem(s) addressed at the encounter when appropriately treated. Risk also includes medical decision making related to the need to initiate or forego further testing, treatment and/or hospitalization. Over the counter medication are assigned a risk level of low. Prescription medication management is assigned a risk level of moderate.
Risk Of Complication Category: No MDM
Product 3 Application Directions: Apply daily face and neck 15 minutes before sun exposure
Product 3 Price (In Dollars - Numeric Only, No Special Characters Or $): 46.00
Product 5 Price (In Dollars - Numeric Only, No Special Characters Or $): 38.00
Name Of Product 2: EltaMD UV Clear Tinted Broad-Spectrum SPF 46 Skin types - Combination | Dry | Oily
Name Of Product 1: Elta MD Uv clear spf 46 tinted
Product 4 Price (In Dollars - Numeric Only, No Special Characters Or $): 52.00
Name Of Product 4: EltaMD UV Skin Recovery Red Color Correcting Green Tint Broad Spectrum SPF 50
Product 6 Price (In Dollars - Numeric Only, No Special Characters Or $): 42.00
Name Of Product 3: EltaMD UV Clear Deep Tinted Broad-Spectrum SPF 46
Name Of Product 6: EltaMD UV Active Broad-Spectrum SPF 50+
Product 1 Units: 1
Detail Level: Zone

## 2025-07-01 NOTE — PROCEDURE: COSMETIC QUOTE
Breast Procedure 1 Free Text Discount (In Dollars- Use Only Numbers And Decimals): 0.00
Laser 19 Percentage Discount: 0
Detail Level: Zone
Apply Anesthesia Fee: No
Face Procedure 8: Lip Perk add on
Number Of Months: 1
Face Procedure 7 Price/Unit (In Dollars- Use Only Numbers And Decimals): 65.00
Face Procedure 5 Price/Unit (In Dollars- Use Only Numbers And Decimals): 329.00
Face Procedure 5: Platinium Hydra Facial
Send Charges To Patient Encounter: Yes
Face Procedure 9 Price/Unit (In Dollars- Use Only Numbers And Decimals): 349.00
Face Procedure 3 Price/Unit (In Dollars- Use Only Numbers And Decimals): 199.00
Face Procedure 4 Price/Unit (In Dollars- Use Only Numbers And Decimals): 265.00
Face Procedure 10 Delacruz/Unit (In Dollars- Use Only Numbers And Decimals): 369.00
Face Procedure 4: Deluxe Hydra Facial
Face Procedure 2: Clinical Facial
Face Procedure 10: VI Peel Advance
Face Procedure 9: VI Peel Original
Face Procedure 6: Keravive Treatment
Face Procedure 1: Microneedling
Face Procedure 6 Price/Unit (In Dollars- Use Only Numbers And Decimals): 499.00
Face Procedure 1 Price/Unit (In Dollars- Use Only Numbers And Decimals): 329
Face Procedure 2 Price/Unit (In Dollars- Use Only Numbers And Decimals): 150.00
Face Procedure 3: Signature Hydra Facial
Face Procedure 7: Eye Perk add on

## 2025-07-17 ENCOUNTER — APPOINTMENT (OUTPATIENT)
Dept: URBAN - METROPOLITAN AREA CLINIC 335 | Facility: CLINIC | Age: 25
Setting detail: DERMATOLOGY
End: 2025-07-17

## 2025-07-17 DIAGNOSIS — Z41.9 ENCOUNTER FOR PROCEDURE FOR PURPOSES OTHER THAN REMEDYING HEALTH STATE, UNSPECIFIED: ICD-10-CM

## 2025-07-17 PROCEDURE — ? COSMETIC CONSULTATION: MICRONEEDLING

## 2025-07-17 PROCEDURE — ? NO CHARGE VISIT

## 2025-07-17 PROCEDURE — ? MICRONEEDLING

## 2025-07-17 NOTE — PROCEDURE: MICRONEEDLING
Additional Info: Great pain tolerance , apply mask after 
Depth In Mm (Location #4): 0.75
Location #3: Cheeks 
Price (Use Numbers Only, No Special Characters Or $): 329.00
Treatment Number (Optional): 1
Depth In Mm (Location #1): 0.25
Location #4: nose
Location #2: eye
How Many Cc Of Bacteriostatic 0.9% Saline Were Added?: 0
Post-Care Instructions: After the procedure, take precautions agains sun exposure. Do not apply sunscreen for 12 hours after the procedure. Do not apply make-up for 12 hours after the procedure. Avoid alcohol based toners for 10-14 days. After 2-3 days patients can return to their regular skin regimen. 
Topical Anesthesia?: 10% benzocaine, 3% lidocaine, 2% tetracaine, 0.01% phenylephrine
Location #1: four head 
Depth In Mm (Location #7): 0.1
Consent: Written consent obtained, risks reviewed including but not limited to pain, scarring, infection and incomplete improvement.  Patient understands the procedure is cosmetic in nature and will require out of pocket payment.
Detail Level: Zone
Location #5: Chin
Depth In Mm (Location #3): 0.95
Infusions (Optional): growth factor

## 2025-07-28 ENCOUNTER — RX ONLY (RX ONLY)
Age: 25
End: 2025-07-28

## 2025-07-28 RX ORDER — ISOTRETINOIN 30 MG/1
CAPSULE, LIQUID FILLED ORAL
Qty: 60 | Refills: 0 | Status: CANCELLED | COMMUNITY
Start: 2025-07-28

## 2025-07-29 ENCOUNTER — RX ONLY (RX ONLY)
Age: 25
End: 2025-07-29

## 2025-07-29 RX ORDER — ISOTRETINOIN 30 MG/1
CAPSULE, LIQUID FILLED ORAL
Qty: 60 | Refills: 0 | Status: ERX

## 2025-07-29 RX ORDER — ISOTRETINOIN 30 MG/1
CAPSULE, LIQUID FILLED ORAL
Qty: 60 | Refills: 0 | Status: CANCELLED
Stop reason: ENTERED-IN-ERROR

## 2025-07-31 ENCOUNTER — RX ONLY (RX ONLY)
Age: 25
End: 2025-07-31

## 2025-07-31 RX ORDER — CLINDAMYCIN PHOSPHATE 10 MG/G
GEL TOPICAL
Qty: 60 | Refills: 0 | Status: ERX | COMMUNITY
Start: 2025-07-31

## 2025-08-23 ENCOUNTER — RX ONLY (RX ONLY)
Age: 25
End: 2025-08-23

## 2025-08-23 RX ORDER — HYDROCORTISONE 25 MG/G
CREAM TOPICAL
Qty: 28.35 | Refills: 0 | Status: ERX | COMMUNITY
Start: 2025-08-23